# Patient Record
Sex: FEMALE | Employment: OTHER | URBAN - METROPOLITAN AREA
[De-identification: names, ages, dates, MRNs, and addresses within clinical notes are randomized per-mention and may not be internally consistent; named-entity substitution may affect disease eponyms.]

---

## 2019-08-28 ENCOUNTER — TRANSFERRED RECORDS (OUTPATIENT)
Dept: HEALTH INFORMATION MANAGEMENT | Facility: CLINIC | Age: 64
End: 2019-08-28

## 2019-10-30 RX ORDER — CHOLECALCIFEROL (VITAMIN D3) 50 MCG
1 TABLET ORAL DAILY
COMMUNITY

## 2019-10-30 RX ORDER — AMOXICILLIN 500 MG
1200 CAPSULE ORAL DAILY
COMMUNITY

## 2019-10-31 ENCOUNTER — ANESTHESIA EVENT (OUTPATIENT)
Dept: SURGERY | Facility: CLINIC | Age: 64
End: 2019-10-31
Payer: COMMERCIAL

## 2019-10-31 ENCOUNTER — ANESTHESIA (OUTPATIENT)
Dept: SURGERY | Facility: CLINIC | Age: 64
End: 2019-10-31
Payer: COMMERCIAL

## 2019-10-31 ENCOUNTER — HOSPITAL ENCOUNTER (OUTPATIENT)
Facility: CLINIC | Age: 64
Discharge: HOME | End: 2019-11-01
Attending: OBSTETRICS & GYNECOLOGY | Admitting: OBSTETRICS & GYNECOLOGY
Payer: COMMERCIAL

## 2019-10-31 ENCOUNTER — SURGERY (OUTPATIENT)
Age: 64
End: 2019-10-31
Payer: COMMERCIAL

## 2019-10-31 DIAGNOSIS — G89.18 POST-OP PAIN: Primary | ICD-10-CM

## 2019-10-31 PROBLEM — Z98.890 POST-OPERATIVE STATE: Status: ACTIVE | Noted: 2019-10-31

## 2019-10-31 PROCEDURE — 25000128 H RX IP 250 OP 636: Performed by: OBSTETRICS & GYNECOLOGY

## 2019-10-31 PROCEDURE — 25000125 ZZHC RX 250: Performed by: NURSE ANESTHETIST, CERTIFIED REGISTERED

## 2019-10-31 PROCEDURE — 40000934 ZZH STATISTIC OUTPATIENT (NON-OBS) DAY

## 2019-10-31 PROCEDURE — 88302 TISSUE EXAM BY PATHOLOGIST: CPT | Mod: 26 | Performed by: OBSTETRICS & GYNECOLOGY

## 2019-10-31 PROCEDURE — 27210794 ZZH OR GENERAL SUPPLY STERILE: Performed by: OBSTETRICS & GYNECOLOGY

## 2019-10-31 PROCEDURE — 25000128 H RX IP 250 OP 636: Performed by: NURSE ANESTHETIST, CERTIFIED REGISTERED

## 2019-10-31 PROCEDURE — 71000012 ZZH RECOVERY PHASE 1 LEVEL 1 FIRST HR: Performed by: OBSTETRICS & GYNECOLOGY

## 2019-10-31 PROCEDURE — 25000566 ZZH SEVOFLURANE, EA 15 MIN: Performed by: OBSTETRICS & GYNECOLOGY

## 2019-10-31 PROCEDURE — 25800030 ZZH RX IP 258 OP 636: Performed by: ANESTHESIOLOGY

## 2019-10-31 PROCEDURE — 88302 TISSUE EXAM BY PATHOLOGIST: CPT | Performed by: OBSTETRICS & GYNECOLOGY

## 2019-10-31 PROCEDURE — 36000058 ZZH SURGERY LEVEL 3 EA 15 ADDTL MIN: Performed by: OBSTETRICS & GYNECOLOGY

## 2019-10-31 PROCEDURE — 71000013 ZZH RECOVERY PHASE 1 LEVEL 1 EA ADDTL HR: Performed by: OBSTETRICS & GYNECOLOGY

## 2019-10-31 PROCEDURE — 40000170 ZZH STATISTIC PRE-PROCEDURE ASSESSMENT II: Performed by: OBSTETRICS & GYNECOLOGY

## 2019-10-31 PROCEDURE — 37000009 ZZH ANESTHESIA TECHNICAL FEE, EACH ADDTL 15 MIN: Performed by: OBSTETRICS & GYNECOLOGY

## 2019-10-31 PROCEDURE — 37000008 ZZH ANESTHESIA TECHNICAL FEE, 1ST 30 MIN: Performed by: OBSTETRICS & GYNECOLOGY

## 2019-10-31 PROCEDURE — 25000128 H RX IP 250 OP 636: Performed by: ANESTHESIOLOGY

## 2019-10-31 PROCEDURE — 40000936 ZZH STATISTIC OUTPATIENT (NON-OBS) NIGHT

## 2019-10-31 PROCEDURE — 88305 TISSUE EXAM BY PATHOLOGIST: CPT | Mod: 26 | Performed by: OBSTETRICS & GYNECOLOGY

## 2019-10-31 PROCEDURE — 25800025 ZZH RX 258: Performed by: OBSTETRICS & GYNECOLOGY

## 2019-10-31 PROCEDURE — 40000935 ZZH STATISTIC OUTPATIENT (NON-OBS) EVE

## 2019-10-31 PROCEDURE — 88305 TISSUE EXAM BY PATHOLOGIST: CPT | Performed by: OBSTETRICS & GYNECOLOGY

## 2019-10-31 PROCEDURE — 25000132 ZZH RX MED GY IP 250 OP 250 PS 637: Performed by: OBSTETRICS & GYNECOLOGY

## 2019-10-31 PROCEDURE — 25800030 ZZH RX IP 258 OP 636: Performed by: OBSTETRICS & GYNECOLOGY

## 2019-10-31 PROCEDURE — 36000056 ZZH SURGERY LEVEL 3 1ST 30 MIN: Performed by: OBSTETRICS & GYNECOLOGY

## 2019-10-31 RX ORDER — FENTANYL CITRATE 50 UG/ML
INJECTION, SOLUTION INTRAMUSCULAR; INTRAVENOUS PRN
Status: DISCONTINUED | OUTPATIENT
Start: 2019-10-31 | End: 2019-10-31

## 2019-10-31 RX ORDER — METOCLOPRAMIDE 5 MG/1
5 TABLET ORAL EVERY 6 HOURS PRN
Status: DISCONTINUED | OUTPATIENT
Start: 2019-10-31 | End: 2019-11-01 | Stop reason: HOSPADM

## 2019-10-31 RX ORDER — METOCLOPRAMIDE HYDROCHLORIDE 5 MG/ML
5 INJECTION INTRAMUSCULAR; INTRAVENOUS EVERY 6 HOURS PRN
Status: DISCONTINUED | OUTPATIENT
Start: 2019-10-31 | End: 2019-11-01 | Stop reason: HOSPADM

## 2019-10-31 RX ORDER — SODIUM CHLORIDE, SODIUM LACTATE, POTASSIUM CHLORIDE, CALCIUM CHLORIDE 600; 310; 30; 20 MG/100ML; MG/100ML; MG/100ML; MG/100ML
INJECTION, SOLUTION INTRAVENOUS CONTINUOUS
Status: DISCONTINUED | OUTPATIENT
Start: 2019-10-31 | End: 2019-10-31 | Stop reason: HOSPADM

## 2019-10-31 RX ORDER — ACETAMINOPHEN 325 MG/1
975 TABLET ORAL ONCE
Status: COMPLETED | OUTPATIENT
Start: 2019-10-31 | End: 2019-10-31

## 2019-10-31 RX ORDER — KETOROLAC TROMETHAMINE 15 MG/ML
15 INJECTION, SOLUTION INTRAMUSCULAR; INTRAVENOUS EVERY 6 HOURS
Status: COMPLETED | OUTPATIENT
Start: 2019-10-31 | End: 2019-11-01

## 2019-10-31 RX ORDER — PROCHLORPERAZINE MALEATE 5 MG
5 TABLET ORAL EVERY 6 HOURS PRN
Status: DISCONTINUED | OUTPATIENT
Start: 2019-10-31 | End: 2019-11-01 | Stop reason: HOSPADM

## 2019-10-31 RX ORDER — DEXAMETHASONE SODIUM PHOSPHATE 4 MG/ML
4 INJECTION, SOLUTION INTRA-ARTICULAR; INTRALESIONAL; INTRAMUSCULAR; INTRAVENOUS; SOFT TISSUE
Status: DISCONTINUED | OUTPATIENT
Start: 2019-10-31 | End: 2019-10-31 | Stop reason: HOSPADM

## 2019-10-31 RX ORDER — IBUPROFEN 600 MG/1
600 TABLET, FILM COATED ORAL EVERY 6 HOURS PRN
Qty: 50 TABLET | Refills: 0 | Status: SHIPPED | OUTPATIENT
Start: 2019-10-31

## 2019-10-31 RX ORDER — LABETALOL HYDROCHLORIDE 5 MG/ML
10 INJECTION, SOLUTION INTRAVENOUS
Status: DISCONTINUED | OUTPATIENT
Start: 2019-10-31 | End: 2019-10-31 | Stop reason: HOSPADM

## 2019-10-31 RX ORDER — ONDANSETRON 4 MG/1
4 TABLET, ORALLY DISINTEGRATING ORAL EVERY 6 HOURS PRN
Status: DISCONTINUED | OUTPATIENT
Start: 2019-10-31 | End: 2019-11-01 | Stop reason: HOSPADM

## 2019-10-31 RX ORDER — NALOXONE HYDROCHLORIDE 0.4 MG/ML
.1-.4 INJECTION, SOLUTION INTRAMUSCULAR; INTRAVENOUS; SUBCUTANEOUS
Status: ACTIVE | OUTPATIENT
Start: 2019-10-31 | End: 2019-11-01

## 2019-10-31 RX ORDER — NALOXONE HYDROCHLORIDE 0.4 MG/ML
.1-.4 INJECTION, SOLUTION INTRAMUSCULAR; INTRAVENOUS; SUBCUTANEOUS
Status: DISCONTINUED | OUTPATIENT
Start: 2019-10-31 | End: 2019-11-01 | Stop reason: HOSPADM

## 2019-10-31 RX ORDER — OXYCODONE HYDROCHLORIDE 5 MG/1
5-10 TABLET ORAL
Status: DISCONTINUED | OUTPATIENT
Start: 2019-10-31 | End: 2019-11-01 | Stop reason: HOSPADM

## 2019-10-31 RX ORDER — LIDOCAINE 40 MG/G
CREAM TOPICAL
Status: DISCONTINUED | OUTPATIENT
Start: 2019-10-31 | End: 2019-11-01 | Stop reason: HOSPADM

## 2019-10-31 RX ORDER — SIMETHICONE 80 MG
80 TABLET,CHEWABLE ORAL EVERY 6 HOURS PRN
Status: DISCONTINUED | OUTPATIENT
Start: 2019-10-31 | End: 2019-11-01 | Stop reason: HOSPADM

## 2019-10-31 RX ORDER — ONDANSETRON 2 MG/ML
INJECTION INTRAMUSCULAR; INTRAVENOUS PRN
Status: DISCONTINUED | OUTPATIENT
Start: 2019-10-31 | End: 2019-10-31

## 2019-10-31 RX ORDER — ONDANSETRON 2 MG/ML
4 INJECTION INTRAMUSCULAR; INTRAVENOUS EVERY 6 HOURS PRN
Status: DISCONTINUED | OUTPATIENT
Start: 2019-10-31 | End: 2019-11-01 | Stop reason: HOSPADM

## 2019-10-31 RX ORDER — HYDRALAZINE HYDROCHLORIDE 20 MG/ML
2.5-5 INJECTION INTRAMUSCULAR; INTRAVENOUS EVERY 10 MIN PRN
Status: DISCONTINUED | OUTPATIENT
Start: 2019-10-31 | End: 2019-10-31 | Stop reason: HOSPADM

## 2019-10-31 RX ORDER — POLYETHYLENE GLYCOL 3350 17 G/17G
17 POWDER, FOR SOLUTION ORAL DAILY
Status: DISCONTINUED | OUTPATIENT
Start: 2019-11-01 | End: 2019-11-01 | Stop reason: HOSPADM

## 2019-10-31 RX ORDER — ONDANSETRON 2 MG/ML
4 INJECTION INTRAMUSCULAR; INTRAVENOUS EVERY 30 MIN PRN
Status: DISCONTINUED | OUTPATIENT
Start: 2019-10-31 | End: 2019-10-31 | Stop reason: HOSPADM

## 2019-10-31 RX ORDER — FENTANYL CITRATE 50 UG/ML
25-50 INJECTION, SOLUTION INTRAMUSCULAR; INTRAVENOUS
Status: DISCONTINUED | OUTPATIENT
Start: 2019-10-31 | End: 2019-10-31 | Stop reason: HOSPADM

## 2019-10-31 RX ORDER — AMOXICILLIN 250 MG
1-2 CAPSULE ORAL 2 TIMES DAILY PRN
Qty: 60 TABLET | Refills: 0 | Status: SHIPPED | OUTPATIENT
Start: 2019-10-31

## 2019-10-31 RX ORDER — DEXAMETHASONE SODIUM PHOSPHATE 4 MG/ML
INJECTION, SOLUTION INTRA-ARTICULAR; INTRALESIONAL; INTRAMUSCULAR; INTRAVENOUS; SOFT TISSUE PRN
Status: DISCONTINUED | OUTPATIENT
Start: 2019-10-31 | End: 2019-10-31

## 2019-10-31 RX ORDER — PROPOFOL 10 MG/ML
INJECTION, EMULSION INTRAVENOUS CONTINUOUS PRN
Status: DISCONTINUED | OUTPATIENT
Start: 2019-10-31 | End: 2019-10-31

## 2019-10-31 RX ORDER — BACLOFEN 20 MG
1 TABLET ORAL DAILY PRN
COMMUNITY

## 2019-10-31 RX ORDER — AMOXICILLIN 250 MG
2 CAPSULE ORAL 2 TIMES DAILY
Status: DISCONTINUED | OUTPATIENT
Start: 2019-10-31 | End: 2019-11-01 | Stop reason: HOSPADM

## 2019-10-31 RX ORDER — OXYCODONE HYDROCHLORIDE 5 MG/1
5-10 TABLET ORAL
Qty: 15 TABLET | Refills: 0 | Status: SHIPPED | OUTPATIENT
Start: 2019-10-31

## 2019-10-31 RX ORDER — PHENAZOPYRIDINE HYDROCHLORIDE 200 MG/1
200 TABLET, FILM COATED ORAL ONCE
Status: COMPLETED | OUTPATIENT
Start: 2019-10-31 | End: 2019-10-31

## 2019-10-31 RX ORDER — IBUPROFEN 600 MG/1
600 TABLET, FILM COATED ORAL EVERY 6 HOURS SCHEDULED
Status: DISCONTINUED | OUTPATIENT
Start: 2019-10-31 | End: 2019-11-01 | Stop reason: HOSPADM

## 2019-10-31 RX ORDER — PROPOFOL 10 MG/ML
INJECTION, EMULSION INTRAVENOUS PRN
Status: DISCONTINUED | OUTPATIENT
Start: 2019-10-31 | End: 2019-10-31

## 2019-10-31 RX ORDER — ONDANSETRON 4 MG/1
4 TABLET, ORALLY DISINTEGRATING ORAL EVERY 30 MIN PRN
Status: DISCONTINUED | OUTPATIENT
Start: 2019-10-31 | End: 2019-10-31 | Stop reason: HOSPADM

## 2019-10-31 RX ORDER — KETOROLAC TROMETHAMINE 30 MG/ML
INJECTION, SOLUTION INTRAMUSCULAR; INTRAVENOUS PRN
Status: DISCONTINUED | OUTPATIENT
Start: 2019-10-31 | End: 2019-10-31

## 2019-10-31 RX ORDER — HYDROXYZINE HYDROCHLORIDE 10 MG/1
10 TABLET, FILM COATED ORAL EVERY 6 HOURS PRN
Status: DISCONTINUED | OUTPATIENT
Start: 2019-10-31 | End: 2019-11-01 | Stop reason: HOSPADM

## 2019-10-31 RX ORDER — HYDROMORPHONE HYDROCHLORIDE 1 MG/ML
0.2 INJECTION, SOLUTION INTRAMUSCULAR; INTRAVENOUS; SUBCUTANEOUS
Status: DISCONTINUED | OUTPATIENT
Start: 2019-10-31 | End: 2019-11-01 | Stop reason: HOSPADM

## 2019-10-31 RX ORDER — CEFAZOLIN SODIUM 1 G/3ML
1 INJECTION, POWDER, FOR SOLUTION INTRAMUSCULAR; INTRAVENOUS SEE ADMIN INSTRUCTIONS
Status: DISCONTINUED | OUTPATIENT
Start: 2019-10-31 | End: 2019-10-31 | Stop reason: HOSPADM

## 2019-10-31 RX ORDER — ACETAMINOPHEN 500 MG
1000 TABLET ORAL EVERY 6 HOURS PRN
Status: DISCONTINUED | OUTPATIENT
Start: 2019-10-31 | End: 2019-10-31

## 2019-10-31 RX ORDER — HYDROMORPHONE HYDROCHLORIDE 1 MG/ML
.3-.5 INJECTION, SOLUTION INTRAMUSCULAR; INTRAVENOUS; SUBCUTANEOUS EVERY 5 MIN PRN
Status: DISCONTINUED | OUTPATIENT
Start: 2019-10-31 | End: 2019-10-31 | Stop reason: HOSPADM

## 2019-10-31 RX ORDER — KETOROLAC TROMETHAMINE 30 MG/ML
30 INJECTION, SOLUTION INTRAMUSCULAR; INTRAVENOUS
Status: DISCONTINUED | OUTPATIENT
Start: 2019-10-31 | End: 2019-10-31 | Stop reason: HOSPADM

## 2019-10-31 RX ORDER — LIDOCAINE HYDROCHLORIDE 20 MG/ML
INJECTION, SOLUTION INFILTRATION; PERINEURAL PRN
Status: DISCONTINUED | OUTPATIENT
Start: 2019-10-31 | End: 2019-10-31

## 2019-10-31 RX ORDER — CEFAZOLIN SODIUM 2 G/100ML
2 INJECTION, SOLUTION INTRAVENOUS
Status: COMPLETED | OUTPATIENT
Start: 2019-10-31 | End: 2019-10-31

## 2019-10-31 RX ORDER — ACETAMINOPHEN 500 MG
1000 TABLET ORAL EVERY 6 HOURS SCHEDULED
Status: DISCONTINUED | OUTPATIENT
Start: 2019-10-31 | End: 2019-11-01 | Stop reason: HOSPADM

## 2019-10-31 RX ADMIN — PROPOFOL 200 MG: 10 INJECTION, EMULSION INTRAVENOUS at 07:36

## 2019-10-31 RX ADMIN — HYDROMORPHONE HYDROCHLORIDE 0.3 MG: 1 INJECTION, SOLUTION INTRAMUSCULAR; INTRAVENOUS; SUBCUTANEOUS at 09:12

## 2019-10-31 RX ADMIN — PROPOFOL 30 MCG/KG/MIN: 10 INJECTION, EMULSION INTRAVENOUS at 07:59

## 2019-10-31 RX ADMIN — VASOPRESSIN 20 ML: 20 INJECTION INTRAVENOUS at 09:01

## 2019-10-31 RX ADMIN — ACETAMINOPHEN 975 MG: 325 TABLET, FILM COATED ORAL at 06:25

## 2019-10-31 RX ADMIN — CEFAZOLIN SODIUM 2 G: 2 INJECTION, SOLUTION INTRAVENOUS at 07:39

## 2019-10-31 RX ADMIN — SODIUM CHLORIDE, POTASSIUM CHLORIDE, SODIUM LACTATE AND CALCIUM CHLORIDE 100 ML/HR: 600; 310; 30; 20 INJECTION, SOLUTION INTRAVENOUS at 09:55

## 2019-10-31 RX ADMIN — PROCHLORPERAZINE MALEATE 5 MG: 5 TABLET ORAL at 18:42

## 2019-10-31 RX ADMIN — KETOROLAC TROMETHAMINE 15 MG: 15 INJECTION, SOLUTION INTRAMUSCULAR; INTRAVENOUS at 20:48

## 2019-10-31 RX ADMIN — DEXTROSE AND SODIUM CHLORIDE: 5; 450 INJECTION, SOLUTION INTRAVENOUS at 19:49

## 2019-10-31 RX ADMIN — HYDROMORPHONE HYDROCHLORIDE 0.5 MG: 1 INJECTION, SOLUTION INTRAMUSCULAR; INTRAVENOUS; SUBCUTANEOUS at 11:07

## 2019-10-31 RX ADMIN — DEXAMETHASONE SODIUM PHOSPHATE 4 MG: 4 INJECTION, SOLUTION INTRA-ARTICULAR; INTRALESIONAL; INTRAMUSCULAR; INTRAVENOUS; SOFT TISSUE at 07:45

## 2019-10-31 RX ADMIN — LIDOCAINE HYDROCHLORIDE 100 MG: 20 INJECTION, SOLUTION INFILTRATION; PERINEURAL at 07:36

## 2019-10-31 RX ADMIN — KETOROLAC TROMETHAMINE 30 MG: 30 INJECTION, SOLUTION INTRAMUSCULAR at 09:18

## 2019-10-31 RX ADMIN — VASOPRESSIN 20 ML: 20 INJECTION INTRAVENOUS at 08:14

## 2019-10-31 RX ADMIN — HYDROXYZINE HYDROCHLORIDE 10 MG: 10 TABLET ORAL at 19:56

## 2019-10-31 RX ADMIN — FENTANYL CITRATE 50 MCG: 50 INJECTION, SOLUTION INTRAMUSCULAR; INTRAVENOUS at 07:40

## 2019-10-31 RX ADMIN — PHENAZOPYRIDINE 200 MG: 200 TABLET ORAL at 06:26

## 2019-10-31 RX ADMIN — FENTANYL CITRATE 50 MCG: 50 INJECTION, SOLUTION INTRAMUSCULAR; INTRAVENOUS at 08:17

## 2019-10-31 RX ADMIN — HYDROMORPHONE HYDROCHLORIDE 0.5 MG: 1 INJECTION, SOLUTION INTRAMUSCULAR; INTRAVENOUS; SUBCUTANEOUS at 09:52

## 2019-10-31 RX ADMIN — SENNOSIDES AND DOCUSATE SODIUM 2 TABLET: 8.6; 5 TABLET ORAL at 19:56

## 2019-10-31 RX ADMIN — HYDROMORPHONE HYDROCHLORIDE 0.2 MG: 1 INJECTION, SOLUTION INTRAMUSCULAR; INTRAVENOUS; SUBCUTANEOUS at 12:34

## 2019-10-31 RX ADMIN — DEXTROSE AND SODIUM CHLORIDE: 5; 450 INJECTION, SOLUTION INTRAVENOUS at 12:35

## 2019-10-31 RX ADMIN — SENNOSIDES AND DOCUSATE SODIUM 2 TABLET: 8.6; 5 TABLET ORAL at 12:34

## 2019-10-31 RX ADMIN — METOCLOPRAMIDE 5 MG: 5 INJECTION, SOLUTION INTRAMUSCULAR; INTRAVENOUS at 19:11

## 2019-10-31 RX ADMIN — ONDANSETRON 4 MG: 2 INJECTION INTRAMUSCULAR; INTRAVENOUS at 14:58

## 2019-10-31 RX ADMIN — ACETAMINOPHEN 1000 MG: 500 TABLET, FILM COATED ORAL at 18:04

## 2019-10-31 RX ADMIN — KETOROLAC TROMETHAMINE 15 MG: 15 INJECTION, SOLUTION INTRAMUSCULAR; INTRAVENOUS at 14:59

## 2019-10-31 RX ADMIN — SODIUM CHLORIDE, POTASSIUM CHLORIDE, SODIUM LACTATE AND CALCIUM CHLORIDE: 600; 310; 30; 20 INJECTION, SOLUTION INTRAVENOUS at 07:29

## 2019-10-31 RX ADMIN — VASOPRESSIN 10 ML: 20 INJECTION INTRAVENOUS at 08:41

## 2019-10-31 RX ADMIN — ONDANSETRON 4 MG: 2 INJECTION INTRAMUSCULAR; INTRAVENOUS at 09:11

## 2019-10-31 RX ADMIN — HYDROMORPHONE HYDROCHLORIDE 0.2 MG: 1 INJECTION, SOLUTION INTRAMUSCULAR; INTRAVENOUS; SUBCUTANEOUS at 14:59

## 2019-10-31 RX ADMIN — FENTANYL CITRATE 50 MCG: 0.05 INJECTION, SOLUTION INTRAMUSCULAR; INTRAVENOUS at 09:53

## 2019-10-31 RX ADMIN — HYDROMORPHONE HYDROCHLORIDE 0.2 MG: 1 INJECTION, SOLUTION INTRAMUSCULAR; INTRAVENOUS; SUBCUTANEOUS at 09:01

## 2019-10-31 ASSESSMENT — LIFESTYLE VARIABLES: TOBACCO_USE: 1

## 2019-10-31 ASSESSMENT — MIFFLIN-ST. JEOR: SCORE: 1228.09

## 2019-10-31 NOTE — ANESTHESIA PREPROCEDURE EVALUATION
"Anesthesia Pre-Procedure Evaluation    Patient: Tamiko Henderson   MRN: [id number] : 1955          Preoperative Diagnosis: URITAN PROLAPSE    Procedure(s):  TOTAL VAGINAL HYSTERECTOMY,  WITH POSTERIOR PARINEOPLASTY, POSSIBLE VAGINAL VAULT SUSPENSION, POSSIBLE ANTERIOR REPAIR  POSSIBLE BILATERAL SALPINGECTOMY  POSTERIOR PARINEOPLASTY, POSSIBLE ANTERIOR REPAIR    History reviewed. No pertinent past medical history.  Past Surgical History:   Procedure Laterality Date     COLONOSCOPY         Anesthesia Evaluation     . Pt has had prior anesthetic.     No history of anesthetic complications          ROS/MED HX    ENT/Pulmonary:     (+)tobacco use, Past use , . .   (-) sleep apnea   Neurologic:  - neg neurologic ROS     Cardiovascular:  - neg cardiovascular ROS      (-) hypertension   METS/Exercise Tolerance:     Hematologic:         Musculoskeletal:         GI/Hepatic:  - neg GI/hepatic ROS      (-) GERD   Renal/Genitourinary:  - ROS Renal section negative       Endo:  - neg endo ROS       Psychiatric:         Infectious Disease:         Malignancy:         Other:                          Physical Exam  Normal systems: cardiovascular, pulmonary and dental    Airway   Mallampati: II  TM distance: >3 FB  Neck ROM: full    Dental     Cardiovascular       Pulmonary             No results found for: WBC, HGB, HCT, PLT, CRP, SED, NA, POTASSIUM, CHLORIDE, CO2, BUN, CR, GLC, JENNIFER, PHOS, MAG, ALBUMIN, PROTTOTAL, ALT, AST, GGT, ALKPHOS, BILITOTAL, BILIDIRECT, LIPASE, AMYLASE, MEGAN, PTT, INR, FIBR, TSH, T4, T3, HCG, HCGS, CKTOTAL, CKMB, TROPN    Preop Vitals  BP Readings from Last 3 Encounters:   10/31/19 (!) 143/68    Pulse Readings from Last 3 Encounters:   10/31/19 60      Resp Readings from Last 3 Encounters:   10/31/19 16    SpO2 Readings from Last 3 Encounters:   10/31/19 100%      Temp Readings from Last 1 Encounters:   10/31/19 36.7  C (98  F) (Temporal)    Ht Readings from Last 1 Encounters:   10/31/19 1.626 m (5' 4\") " "     Wt Readings from Last 1 Encounters:   10/31/19 69.8 kg (153 lb 14.4 oz)    Estimated body mass index is 26.42 kg/m  as calculated from the following:    Height as of this encounter: 1.626 m (5' 4\").    Weight as of this encounter: 69.8 kg (153 lb 14.4 oz).       Anesthesia Plan      History & Physical Review  History and physical reviewed and following examination; no interval change.    ASA Status:  1 .    NPO Status:  > 8 hours    Plan for General and LMA with Intravenous and Propofol induction. Maintenance will be TIVA.    PONV prophylaxis:  Ondansetron (or other 5HT-3) and Dexamethasone or Solumedrol       Postoperative Care  Postoperative pain management:  Multi-modal analgesia.      Consents  Anesthetic plan, risks, benefits and alternatives discussed with:  Patient..                 Shawnee Angelo MD  "

## 2019-10-31 NOTE — BRIEF OP NOTE
[facility] Brief Operative Note    Pre-operative diagnosis: Pelvic organ prolapse   Post-operative diagnosis Same  Elongated cervix   Procedure: TVH, Bilateral salpingectomies, Anterior and Posterior vaginal repairs, perineopasty   Surgeon(s): Surgeon(s) and Role:     * Dalila Craig MD - Primary     * Dominga Chavarria PA-C - Assisting     * Lupe Davis MD - Assisting   Estimated blood loss: 100cc  Drains: van  Vaginal Pack x1  Complications: none  Anesthesia: general    Specimens: ID Type Source Tests Collected by Time Destination   A : Uterus and Cervix Tissue Uterus and Cervix SURGICAL PATHOLOGY EXAM Dalila Craig MD 10/31/2019  8:17 AM    B : Right Fallopian Tube Tissue Fallopian Tube, Right SURGICAL PATHOLOGY EXAM Dalila Craig MD 10/31/2019  8:22 AM    C : Left Fallopian Tube Tissue Fallopian Tube, Left SURGICAL PATHOLOGY EXAM Dalila Craig MD 10/31/2019  8:24 AM       Findings: Complete grade 4  prolase of cervix and uterus primarily due to elongated cervix. Grade 3-4 cystocele and grade 2 rectocele with laxity of perineum. Good support of vaginal walls and cuff and perineal tissue post procedure.

## 2019-10-31 NOTE — PLAN OF CARE
Pt A&O, drowsy. VSS. On 1L O2, Capno WNL. Pain controlled with prn dilaudid and scheduled toradol. 1x dose of zofran given. Tolerating clears. BS hypoactive. Sandhu patent and draining. Packing in place. CMS intact. IV infusing. Not out of bed yet. PCD's on. Will cont to monitor.

## 2019-10-31 NOTE — OP NOTE
Procedure Date: 10/31/2019      PREOPERATIVE DIAGNOSIS:  Pelvic organ prolapse.      POSTOPERATIVE DIAGNOSES:   1.  Pelvic organ prolapse.   2.  Elongated cervix.      PROCEDURES:  Total vaginal hysterectomy, bilateral salpingectomies, anterior and posterior vaginal repair and perineoplasty.      SURGEONS:  Dr. Craig and Dr. Davis      ASSISTANT:  Dominga Chavarria PA-C      ANESTHESIA:  General.      ESTIMATED BLOOD LOSS:  100 mL.      DRAINS:  Sandhu, vaginal pack x1 mL.      COMPLICATIONS:  None.      SPECIMENS REMOVED:  Uterus and cervix and right and left fallopian tubes.  Excess vaginal tissue was discarded.      FINDINGS:  There was a complete grade 4 prolapse of the uterus and cervix primarily to the elongated cervix.  There was a grade 3-4 cystocele and a grade 2 rectocele with laxity of the perineum.  There was good support of the vaginal walls and the vaginal cuff and perineal tissue post procedure.  The urine appeared clear with the exception of orange staining from preoperative Pyridium upon insertion of the Sandhu catheter.      INDICATIONS:  Tamiko is a 64-year-old female with a history of increasingly symptomatic pelvic prolapse, which has been getting worse and more bothersome.  It feels like there is constant pressure and that she is sitting on something.  On examination, she was noted to have a grade 2 rectocele with laxity of the perineum, cystocele and grade 3 to 4 uterine prolapse.  We discussed options of conservative treatment with a pessary versus surgical treatment and she desires to proceed with definitive surgical therapy.  The risks, benefits and possible complications of the procedure were discussed with the patient including but not limited to the risk of infection, bleeding, blood transfusion, damage to surrounding structures and possible need for further surgery.  Consent was signed.      DESCRIPTION OF TECHNIQUE:  The patient was brought to the operating room and general anesthetic was  induced without complication.  The patient was placed in the dorsal lithotomy position and exam under anesthesia was performed.  The patient was then prepped and draped in the usual sterile fashion and the bladder was drained.  The surgical timeout was then performed identifying the correct patient and procedure.  A weighted speculum was placed in the posterior vagina and the cervix was grasped with 2 single-tooth tenaculums anteriorly and posteriorly.  Dilute Pitressin 20 in 100 mL was injected into the vaginal mucosa circumferentially for a total of 30 mL.  A circumferential incision was made in the vaginal mucosa on the cervix and the vaginal mucosa was dissected off the underlying cervix, both anteriorly and posteriorly.  The peritoneum was entered posteriorly without complication and the peritoneum was anchored to the vaginal cuff with a figure-of-eight suture of 0 Vicryl.  The peritoneum was noted to be high up anteriorly.  Therefore, the uterosacral ligaments were both clamped, divided and ligated with 0 Vicryl and tags were placed on these sutures.  I was unable to enter the peritoneum anteriorly.  The cardinal ligaments were then clamped bilaterally, divided and ligated.  The uterine blood vessels were then clamped, divided and ligated and the broad ligament was progressively clamped, divided and ligated to the level of the cornua.  The cornua were clamped across and divided and the uterus and cervix were removed.  The pedicles were then sutured with 0 Vicryl and tags placed.  The left ovary was able to be visualized and noted to be normal.  The right ovary could not be visualized due to a tire location in the pelvis.  The left fallopian tube was grasped with a long [place] towards the distal end and the tube was clamped at the level of the pedicle and divided and removed.  The area was then ligated with 0 Vicryl.  The right fallopian tube was grasped, clamped and divided in a similar fashion and the  pedicle was ligated.  Additional figure-of-eight sutures were placed in the lower lateral aspects of the pedicles for adequate hemostasis.  A baby lap sponge was placed into the peritoneal cavity and the peritoneum was then closed in running pursestring fashion with 0 Vicryl.  The lap sponge was then removed and the peritoneum was noted to be cinched closed well.  The cul-de-sac was then obliterated by placing 2-0 Vicryl sutures joining the cardinal ligaments and the posterior peritoneum as well as the uterosacral ligaments and the posterior peritoneum.        Attention was then directed towards the anterior repair.  The lateral aspects of the cystocele were grasped at the vaginal cuff and dilute Pitressin was injected into the vaginal mucosa using a total of 20 mL.  A linear incision was made in the midline of the vaginal mucosa up to approximately 1.5 cm below the urethral meatus.  The vaginal mucosa was then dissected free from the underlying bladder, both bluntly and sharply until firmer fascial tissue was found on the sides.  The cystocele was then reduced using figure-of-eight sutures of 2-0 Vicryl starting distally and ending proximally.  The excess vaginal mucosa was then removed.  The vaginal mucosa and vaginal cuff were then closed in running locked fashion with 0 Vicryl.  Attention was then directed towards the posterior repair and perineoplasty.  Three Allis clamps were placed, 1 on the perineal skin above the rectum and 2 on the lateral aspects of the vaginal introitus.  Dilute Pitressin was injected under the perineal skin and in the posterior vaginal mucosa.  A triangular incision was made excising the skin of the perineum.  A linear incision was made sharply in the vaginal mucosa to just below the level of the vaginal cuff.  The vaginal mucosa was then dissected free from the underlying rectal tissue both bluntly and sharply until strong fascial tissue was encountered circumferentially.  The  rectocele was then reduced using a 2-0 Vicryl suture in pursestring fashion.  Once this was cinched closed, the rectocele reduced well.  The excess vaginal mucosa was removed and the vaginal mucosa was closed in running locked fashion to the level of the introitus.  The perineal musculature was then reinforced and the perineal skin was closed in subcuticular fashion.  Adequate hemostasis was assured.  A vaginal packing moistened with saline was placed into the vagina and a Sandhu catheter was placed into the bladder and the balloon inflated.  The patient tolerated the procedure well and was brought to the recovery room in stable condition.  All final counts were correct.         JENNIFER YORK MD             D: 10/31/2019   T: 10/31/2019   MT: IGLESIA      Name:     JASKARAN LEWIS   MRN:      [id number]        Account:        [id number]   :      1955           Procedure Date: 10/31/2019      Document: [id number]

## 2019-10-31 NOTE — PROGRESS NOTES
"POD #0  S: Pain fairly well controlled. Can feel it when it wears off but otherwise able to sleep in between. Some nausea, has received Zofran. No SOB, Chest pain. No vaginal bleeding  through pack. Not out of bed yet.   O:  Patient Vitals for the past 24 hrs:   BP Temp Temp src Pulse Heart Rate Resp SpO2 Height Weight   10/31/19 1515 -- -- -- -- -- 12 -- -- --   10/31/19 1500 (!) 148/58 96.2  F (35.7  C) Oral -- 67 13 94 % -- --   10/31/19 1400 (!) 146/67 -- -- -- 86 -- -- -- --   10/31/19 1300 117/51 -- -- -- 71 8 97 % -- --   10/31/19 1249 -- -- -- -- -- 12 -- -- --   10/31/19 1230 139/61 -- -- -- 82 8 -- -- --   10/31/19 1200 124/47 -- -- -- 65 -- -- -- --   10/31/19 1141 (!) 141/79 96.5  F (35.8  C) Oral -- 58 11 97 % -- --   10/31/19 1115 132/69 97  F (36.1  C) -- 56 62 10 98 % -- --   10/31/19 1100 (!) 116/90 97.2  F (36.2  C) -- 72 77 12 97 % -- --   10/31/19 1045 (!) 140/74 97.2  F (36.2  C) -- 59 80 11 95 % -- --   10/31/19 1030 (!) 145/71 97.3  F (36.3  C) -- 79 86 16 96 % -- --   10/31/19 1015 110/63 97.3  F (36.3  C) -- 65 71 12 98 % -- --   10/31/19 1000 115/57 97.5  F (36.4  C) -- 70 75 10 92 % -- --   10/31/19 0956 -- 97.5  F (36.4  C) -- -- 63 9 98 % -- --   10/31/19 0945 126/46 97.5  F (36.4  C) -- 74 70 10 98 % -- --   10/31/19 0931 137/69 98.7  F (37.1  C) -- 71 -- 14 98 % -- --   10/31/19 0633 (!) 143/68 98  F (36.7  C) Temporal 60 -- 16 100 % 1.626 m (5' 4\") 69.8 kg (153 lb 14.4 oz)     Intake/Output Summary (Last 24 hours) at 10/31/2019 1556  Last data filed at 10/31/2019 1125  Gross per 24 hour   Intake 1200 ml   Output 100 ml   Net 1100 ml     Drowsy but easily arousable and oriented.  Abd: soft , nondistended, nontender to palpation.   Van present and draining.   Extrem: nontender,, no edema, SCD's in place.     A: POD #0 s/p TVH, bilateral salpingectomies, Anterior and posterior repair, perineoplasty  Vag pack and van in place  Stable with adequate pain control.   P: Begin ambulation " tonight when more alert  ADAT  Reviewed pan control measures. Will make tylenol scheduled. Has toradol scheduled followed by scheduled ibuprofen.  Oxycodone for BTB when tolerating po  Remove pack and van in am.   Anticipate home tomorrow, insts given

## 2019-10-31 NOTE — ANESTHESIA CARE TRANSFER NOTE
Patient: Tamiko Henderson    Procedure(s):  TOTAL VAGINAL HYSTERECTOMY,  WITH POSTERIOR PARINEOPLASTY,  ANTERIOR REPAIR  BILATERAL SALPINGECTOMY  POSTERIOR PARINEOPLASTY,  ANTERIOR REPAIR    Diagnosis: URITAN PROLAPSE  Diagnosis Additional Information: No value filed.    Anesthesia Type:   General, LMA     Note:  Airway :Face Mask  Patient transferred to:PACU  Comments: LMA removed awake in OR, exchanging well, to recovery, VSSHandoff Report: Identifed the Patient, Identified the Reponsible Provider, Reviewed the pertinent medical history, Discussed the surgical course, Reviewed Intra-OP anesthesia mangement and issues during anesthesia, Set expectations for post-procedure period and Allowed opportunity for questions and acknowledgement of understanding      Vitals: (Last set prior to Anesthesia Care Transfer)    CRNA VITALS  10/31/2019 0859 - 10/31/2019 0933      10/31/2019             Pulse:  80    SpO2:  98 %    Resp Rate (observed):  (!) 4                Electronically Signed By: LIANG Sage CRNA  October 31, 2019  9:33 AM

## 2019-10-31 NOTE — PLAN OF CARE
[id number]: A/ox4. C/o bilateral abdominal cramping L side >R. Pain managed this shift with scheduled Tylenol. Regular diet. Sandhu patent. Packing in place with small amount of serosanguineous drainage. CMS intact. VSS. Ambulated in osborne with SBA assist + gait belt. Compazine for nausea.

## 2019-11-01 VITALS
TEMPERATURE: 97.5 F | OXYGEN SATURATION: 94 % | HEIGHT: 64 IN | RESPIRATION RATE: 16 BRPM | SYSTOLIC BLOOD PRESSURE: 127 MMHG | BODY MASS INDEX: 26.27 KG/M2 | WEIGHT: 153.9 LBS | DIASTOLIC BLOOD PRESSURE: 60 MMHG | HEART RATE: 56 BPM

## 2019-11-01 LAB
GLUCOSE BLDC GLUCOMTR-MCNC: 116 MG/DL (ref 70–99)
GLUCOSE BLDC GLUCOMTR-MCNC: 123 MG/DL (ref 70–99)
HGB BLD-MCNC: 11.2 G/DL (ref 11.7–15.7)

## 2019-11-01 PROCEDURE — 85018 HEMOGLOBIN: CPT | Performed by: OBSTETRICS & GYNECOLOGY

## 2019-11-01 PROCEDURE — 40000934 ZZH STATISTIC OUTPATIENT (NON-OBS) DAY

## 2019-11-01 PROCEDURE — 25800025 ZZH RX 258: Performed by: OBSTETRICS & GYNECOLOGY

## 2019-11-01 PROCEDURE — 25000132 ZZH RX MED GY IP 250 OP 250 PS 637: Performed by: OBSTETRICS & GYNECOLOGY

## 2019-11-01 PROCEDURE — 25000128 H RX IP 250 OP 636: Performed by: OBSTETRICS & GYNECOLOGY

## 2019-11-01 PROCEDURE — 36415 COLL VENOUS BLD VENIPUNCTURE: CPT | Performed by: OBSTETRICS & GYNECOLOGY

## 2019-11-01 PROCEDURE — 82962 GLUCOSE BLOOD TEST: CPT

## 2019-11-01 RX ADMIN — SENNOSIDES AND DOCUSATE SODIUM 2 TABLET: 8.6; 5 TABLET ORAL at 08:47

## 2019-11-01 RX ADMIN — KETOROLAC TROMETHAMINE 15 MG: 15 INJECTION, SOLUTION INTRAMUSCULAR; INTRAVENOUS at 03:01

## 2019-11-01 RX ADMIN — IBUPROFEN 600 MG: 600 TABLET, FILM COATED ORAL at 11:48

## 2019-11-01 RX ADMIN — KETOROLAC TROMETHAMINE 15 MG: 15 INJECTION, SOLUTION INTRAMUSCULAR; INTRAVENOUS at 08:47

## 2019-11-01 RX ADMIN — DEXTROSE AND SODIUM CHLORIDE: 5; 450 INJECTION, SOLUTION INTRAVENOUS at 02:59

## 2019-11-01 RX ADMIN — POLYETHYLENE GLYCOL 3350 17 G: 17 POWDER, FOR SOLUTION ORAL at 08:47

## 2019-11-01 RX ADMIN — ACETAMINOPHEN 1000 MG: 500 TABLET, FILM COATED ORAL at 00:02

## 2019-11-01 RX ADMIN — ACETAMINOPHEN 1000 MG: 500 TABLET, FILM COATED ORAL at 11:47

## 2019-11-01 RX ADMIN — ACETAMINOPHEN 1000 MG: 500 TABLET, FILM COATED ORAL at 06:11

## 2019-11-01 NOTE — PROGRESS NOTES
Discharge    Patient discharged to home with daughter  Care plan notedone    Listed belongings gathered and returned to patient. YES   Care Plan and Patient education resolved: YES  Prescriptions if needed, hard copies sent with patient  YES  Home and hospital acquired medications returned to patient: NA  Medication Bin checked and emptied on discharge YES  Follow up appointment made for patient:  NO

## 2019-11-01 NOTE — PLAN OF CARE
AVSS; abdominal/back pain controlled with scheduled tylenol and toradol; warm and cold packs; van with adequate urine output; IV D51/2NS @ 125/hr; pt tolerating clears, denies nausea; denies flatus; no vaginal drainage noted; will discontinue vaginal packing and van this am.

## 2019-11-01 NOTE — PLAN OF CARE
[id number]. A&Ox4. VSS on RA. C/o bilateral abdominal pain L side >R. Pain managed this shift with PRN Atarax, ice and scheduled toradol. Regular diet. Sandhu patent. Packing in place with small amount of serosanguineous drainage. BS; hypo active, no flatus. CMS intact. SBA, gait belt. Was experiencing nausea previously,  denying nausea this shift. PIV infusing. Continue to monitor.

## 2019-11-01 NOTE — PROGRESS NOTES
Given discharge instructions, belongings, prescriptions and discharged to home with family, vss and patient was comfortable at the time  Of discharge.

## 2019-11-01 NOTE — PROGRESS NOTES
Pt had a small amount of drainage after vaginal packing removed at about 0620; (about 30 ml); drainage slowed to spotting. Sandhu removed at about 0726.

## 2019-11-01 NOTE — PROGRESS NOTES
[facility] Gynecology Post-Op / Progress Note         Assessment and Plan:    Assessment:   Post-operative day #1  Vaginal hysterectomy  Bilateral salpingectomy  Posterior repair  Perineoplasty     Doing well.      Plan:   Trial of void  Ambulate  Advance activity as tolerated    Discharge later today           Interval History:   Doing well.  Nausea resolved.  Pain is well-controlled.  No fevers.     Packing and van out, has not yet voided.       Significant Problems:    None          Review of Systems:    The Review of Systems is negative other than noted in the HPI          Medications:     Medications Prior to Admission   Medication Sig Dispense Refill Last Dose     GLUCOSAMINE HCL PO Take 1 tablet by mouth every morning   10/30/2019 at am     Magnesium Oxide 500 MG TABS Take 1 tablet by mouth daily as needed    at prn     Omega-3 Fatty Acids (FISH OIL) 1200 MG capsule Take 1,200 mg by mouth daily   10/24/2019 at am     vitamin B complex with vitamin C (VITAMIN  B COMPLEX) tablet Take 1 tablet by mouth daily   10/30/2019 at am     vitamin D3 (CHOLECALCIFEROL) 2000 units (50 mcg) tablet Take 1 tablet by mouth daily   10/30/2019 at am             Physical Exam:   Temp: 97.5  F (36.4  C) Temp src: Oral BP: 127/60 Pulse: 56 Heart Rate: 63 Resp: 16 SpO2: 94 % O2 Device: None (Room air) Oxygen Delivery: 1 LPM  Well appearing, NAD  Abd soft, nondistended  Ext: No edema          Data:     Lab Results   Component Value Date    HGB 11.2 (L) 11/01/2019     No imaging studies have been ordered    Karen Dumas MD

## 2019-11-01 NOTE — PLAN OF CARE
A&O x 4, SBA to the BR, vss, on scheduled toradol for  abdominal pain, van d/cd, voiding fine,small amount of vaginal drainage noted, IV saline locked, no c/o of nausea, tolerating diet, plan to discharge later today if stable.

## 2019-11-04 LAB — COPATH REPORT: NORMAL

## 2022-03-18 NOTE — ANESTHESIA POSTPROCEDURE EVALUATION
Patient: Tamiko Henderson    Procedure(s):  TOTAL VAGINAL HYSTERECTOMY,  WITH POSTERIOR PARINEOPLASTY,  ANTERIOR REPAIR  BILATERAL SALPINGECTOMY  POSTERIOR PARINEOPLASTY,  ANTERIOR REPAIR    Diagnosis:URITAN PROLAPSE  Diagnosis Additional Information: No value filed.    Anesthesia Type:  General, LMA    Note:  Anesthesia Post Evaluation    Patient location during evaluation: PACU  Patient participation: Able to fully participate in evaluation  Level of consciousness: awake and alert  Pain management: adequate  Airway patency: patent  Cardiovascular status: acceptable  Respiratory status: acceptable and unassisted  Hydration status: acceptable  PONV: none             Last vitals:  Vitals:    10/31/19 0633 10/31/19 0931   BP: (!) 143/68 137/69   Pulse: 60 71   Resp: 16 14   Temp: 36.7  C (98  F) 37.1  C (98.7  F)   SpO2: 100% 98%         Electronically Signed By: Shawnee Angelo MD  October 31, 2019  9:51 AM   Self

## 2023-04-28 NOTE — PROGRESS NOTES
Admission medication history interview status for the 10/31/2019  admission is complete. See EPIC admission navigator for prior to admission medications     Medication history source reliability:Good    Medication history interview source(s):Patient    Medication history resources (including written lists, pill bottles, clinic record):None    Primary [email or web address]    Additional medication history information not noted on PTA med list none    Time spent in this activity: 25 minutes    Prior to Admission medications    Medication Sig Last Dose Taking? Auth Provider   GLUCOSAMINE HCL PO Take 1 tablet by mouth every morning 10/30/2019 at am Yes Reported, Patient   Magnesium Oxide 500 MG TABS Take 1 tablet by mouth daily as needed  at prn Yes Reported, Patient   Omega-3 Fatty Acids (FISH OIL) 1200 MG capsule Take 1,200 mg by mouth daily 10/24/2019 at am Yes Reported, Patient   vitamin B complex with vitamin C (VITAMIN  B COMPLEX) tablet Take 1 tablet by mouth daily 10/30/2019 at am Yes Reported, Patient   vitamin D3 (CHOLECALCIFEROL) 2000 units (50 mcg) tablet Take 1 tablet by mouth daily 10/30/2019 at am Yes Reported, Patient          Head,  normocephalic,  atraumatic,  Face,  Face within normal limits,  Ears,  External ears within normal limits,  Nose/Nasopharynx,  External nose  normal appearance,  nares patent,

## (undated) DEVICE — DECANTER BAG 2002S

## (undated) DEVICE — SUCTION CANISTER MEDIVAC LINER 3000ML W/LID 65651-530

## (undated) DEVICE — SU CHROMIC 2-0 CT 27" 801H

## (undated) DEVICE — SU VICRYL 0 TIE 12X18" J906G

## (undated) DEVICE — BLADE CLIPPER 4406

## (undated) DEVICE — SU VICRYL 2-0 CT-2 27" J333H

## (undated) DEVICE — Device

## (undated) DEVICE — SYR 03ML LL W/O NDL 309657

## (undated) DEVICE — SYR 10ML SLIP TIP W/O NDL

## (undated) DEVICE — PACK MAJOR LITHOTOMY SBA15MLFSD

## (undated) DEVICE — WIPES FOLEY CARE SURESTEP PROVON DFC100

## (undated) DEVICE — SU VICRYL 0 CT 36" J358H

## (undated) DEVICE — SOL WATER IRRIG 1000ML BOTTLE 2F7114

## (undated) DEVICE — CATH TRAY FOLEY SURESTEP 16FR WDRAIN BAG STLK LATEX A300316A

## (undated) DEVICE — GLOVE PROTEXIS BLUE W/NEU-THERA 6.5  2D73EB65

## (undated) DEVICE — CATH TRAY FOLEY SURESTEP 16FR W/URINE MTR STATLK LF A303416A

## (undated) DEVICE — SU VICRYL 0 CT-2 27" J334H

## (undated) DEVICE — SU VICRYL 0 CT-1 CR 8X27" UND JJ41G

## (undated) DEVICE — SPONGE PACK VAGINAL 2"X9

## (undated) DEVICE — SU CHROMIC 3-0 CT-1 27" 810H

## (undated) DEVICE — GLOVE PROTEXIS POWDER FREE 6.0 ORTHOPEDIC 2D73ET60

## (undated) DEVICE — SU VICRYL 3-0 CT-1 36" J338H

## (undated) DEVICE — NDL 22GA 1.5"

## (undated) DEVICE — LINEN TOWEL PACK X5 5464

## (undated) DEVICE — GLOVE PROTEXIS W/NEU-THERA 6.5  2D73TE65

## (undated) DEVICE — SOL NACL 0.9% INJ 250ML BAG 2B1322Q

## (undated) RX ORDER — ONDANSETRON 2 MG/ML
INJECTION INTRAMUSCULAR; INTRAVENOUS
Status: DISPENSED
Start: 2019-10-31

## (undated) RX ORDER — FENTANYL CITRATE 50 UG/ML
INJECTION, SOLUTION INTRAMUSCULAR; INTRAVENOUS
Status: DISPENSED
Start: 2019-10-31

## (undated) RX ORDER — GLYCOPYRROLATE 0.2 MG/ML
INJECTION, SOLUTION INTRAMUSCULAR; INTRAVENOUS
Status: DISPENSED
Start: 2019-10-31

## (undated) RX ORDER — PROPOFOL 10 MG/ML
INJECTION, EMULSION INTRAVENOUS
Status: DISPENSED
Start: 2019-10-31

## (undated) RX ORDER — HYDROMORPHONE HYDROCHLORIDE 1 MG/ML
INJECTION, SOLUTION INTRAMUSCULAR; INTRAVENOUS; SUBCUTANEOUS
Status: DISPENSED
Start: 2019-10-31

## (undated) RX ORDER — FENTANYL CITRATE 0.05 MG/ML
INJECTION, SOLUTION INTRAMUSCULAR; INTRAVENOUS
Status: DISPENSED
Start: 2019-10-31

## (undated) RX ORDER — PHENAZOPYRIDINE HYDROCHLORIDE 200 MG/1
TABLET, FILM COATED ORAL
Status: DISPENSED
Start: 2019-10-31

## (undated) RX ORDER — ACETAMINOPHEN 325 MG/1
TABLET ORAL
Status: DISPENSED
Start: 2019-10-31

## (undated) RX ORDER — LIDOCAINE HYDROCHLORIDE 20 MG/ML
INJECTION, SOLUTION EPIDURAL; INFILTRATION; INTRACAUDAL; PERINEURAL
Status: DISPENSED
Start: 2019-10-31

## (undated) RX ORDER — DEXAMETHASONE SODIUM PHOSPHATE 4 MG/ML
INJECTION, SOLUTION INTRA-ARTICULAR; INTRALESIONAL; INTRAMUSCULAR; INTRAVENOUS; SOFT TISSUE
Status: DISPENSED
Start: 2019-10-31

## (undated) RX ORDER — KETOROLAC TROMETHAMINE 30 MG/ML
INJECTION, SOLUTION INTRAMUSCULAR; INTRAVENOUS
Status: DISPENSED
Start: 2019-10-31